# Patient Record
Sex: MALE | Race: WHITE | NOT HISPANIC OR LATINO | Employment: PART TIME | URBAN - METROPOLITAN AREA
[De-identification: names, ages, dates, MRNs, and addresses within clinical notes are randomized per-mention and may not be internally consistent; named-entity substitution may affect disease eponyms.]

---

## 2018-06-13 ENCOUNTER — OFFICE VISIT (OUTPATIENT)
Dept: INTERNAL MEDICINE | Facility: CLINIC | Age: 23
End: 2018-06-13
Payer: COMMERCIAL

## 2018-06-13 VITALS
DIASTOLIC BLOOD PRESSURE: 68 MMHG | HEART RATE: 74 BPM | WEIGHT: 185.1 LBS | TEMPERATURE: 98.5 F | BODY MASS INDEX: 25.07 KG/M2 | RESPIRATION RATE: 18 BRPM | SYSTOLIC BLOOD PRESSURE: 129 MMHG | OXYGEN SATURATION: 97 % | HEIGHT: 72 IN

## 2018-06-13 DIAGNOSIS — F31.81 BIPOLAR 2 DISORDER (H): Primary | ICD-10-CM

## 2018-06-13 PROBLEM — F31.70 BIPOLAR AFFECTIVE DISORDER IN REMISSION (H): Status: ACTIVE | Noted: 2018-06-13

## 2018-06-13 PROBLEM — F31.70 BIPOLAR AFFECTIVE DISORDER IN REMISSION (H): Status: RESOLVED | Noted: 2018-06-13 | Resolved: 2018-06-13

## 2018-06-13 LAB
BASOPHILS # BLD AUTO: 0 10E9/L (ref 0–0.2)
BASOPHILS NFR BLD AUTO: 0.5 %
DIFFERENTIAL METHOD BLD: NORMAL
EOSINOPHIL # BLD AUTO: 0.3 10E9/L (ref 0–0.7)
EOSINOPHIL NFR BLD AUTO: 4.9 %
ERYTHROCYTE [DISTWIDTH] IN BLOOD BY AUTOMATED COUNT: 12.7 % (ref 10–15)
HCT VFR BLD AUTO: 41.4 % (ref 40–53)
HGB BLD-MCNC: 14 G/DL (ref 13.3–17.7)
LYMPHOCYTES # BLD AUTO: 2.3 10E9/L (ref 0.8–5.3)
LYMPHOCYTES NFR BLD AUTO: 39.7 %
MCH RBC QN AUTO: 29.7 PG (ref 26.5–33)
MCHC RBC AUTO-ENTMCNC: 33.8 G/DL (ref 31.5–36.5)
MCV RBC AUTO: 88 FL (ref 78–100)
MONOCYTES # BLD AUTO: 0.4 10E9/L (ref 0–1.3)
MONOCYTES NFR BLD AUTO: 6.6 %
NEUTROPHILS # BLD AUTO: 2.8 10E9/L (ref 1.6–8.3)
NEUTROPHILS NFR BLD AUTO: 48.3 %
PLATELET # BLD AUTO: 181 10E9/L (ref 150–450)
RBC # BLD AUTO: 4.72 10E12/L (ref 4.4–5.9)
WBC # BLD AUTO: 5.7 10E9/L (ref 4–11)

## 2018-06-13 PROCEDURE — 99000 SPECIMEN HANDLING OFFICE-LAB: CPT | Performed by: INTERNAL MEDICINE

## 2018-06-13 PROCEDURE — 85025 COMPLETE CBC W/AUTO DIFF WBC: CPT | Performed by: INTERNAL MEDICINE

## 2018-06-13 PROCEDURE — 36415 COLL VENOUS BLD VENIPUNCTURE: CPT | Performed by: INTERNAL MEDICINE

## 2018-06-13 PROCEDURE — 80175 DRUG SCREEN QUAN LAMOTRIGINE: CPT | Mod: 90 | Performed by: INTERNAL MEDICINE

## 2018-06-13 PROCEDURE — 99214 OFFICE O/P EST MOD 30 MIN: CPT | Performed by: INTERNAL MEDICINE

## 2018-06-13 RX ORDER — LAMOTRIGINE 200 MG/1
200 TABLET ORAL DAILY
Refills: 0 | COMMUNITY
Start: 2018-06-01

## 2018-06-13 RX ORDER — BUPROPION HYDROCHLORIDE 300 MG/1
300 TABLET ORAL DAILY
Refills: 1 | COMMUNITY
Start: 2018-06-01

## 2018-06-13 NOTE — PROGRESS NOTES
SUBJECTIVE:   Fabián Larry is a 23 year old male who presents to clinic today for the following health issues:    He was diagnosed with bipolar disorder last year.     History of depression was originally taking medications, then diagnosed with bipolar disorder and started on buproprion and lamotrigine.     Doing well on these medications.     Also history of anxiety.     No thoughts of suicide      Problem list and histories reviewed & adjusted, as indicated.  Additional history: as documented    Patient Active Problem List   Diagnosis     Hiatal hernia     Bipolar 2 disorder (H)     Past Surgical History:   Procedure Laterality Date     DAVINCI NISSEN FUNDOPLICATION  9/3/2013    Procedure: DAVINCI NISSEN FUNDOPLICATION;  DAVINCI LAPAROSCOPIC, LYSIS OF ADHESIONS, RE-DO FOR RECURRENT HIATAL HERNIA REPAIR AND NISSEN FUNDOPLICATION, PLACEMENT OF BIO-A MESH, REPAIR OF G-TUBE SITE;  Surgeon: Abdias Pugh MD;  Location: [facility]     HERNIA REPAIR       LAPAROSCOPIC LYSIS ADHESIONS  9/3/2013    Procedure: LAPAROSCOPIC LYSIS ADHESIONS;;  Surgeon: Abdias Pugh MD;  Location: [facility] OR     venu fundoplication[       venu fundopli[         Social History   Substance Use Topics     Smoking status: Former Smoker     Quit date: 7/17/2013     Smokeless tobacco: Never Used     Alcohol use No     Family History   Problem Relation Age of Onset     Hypertension Mother      DIABETES Father            Reviewed and updated as needed this visit by clinical staff  Tobacco  Allergies  Meds  Med Hx  Surg Hx  Fam Hx  Soc Hx      Reviewed and updated as needed this visit by Provider         ROS:  Constitutional, HEENT, cardiovascular, pulmonary, gi and gu systems are negative, except as otherwise noted.    OBJECTIVE:     /68 (BP Location: Right arm, Patient Position: Sitting, Cuff Size: Adult Regular)  Pulse 74  Temp 98.5  F (36.9  C) (Oral)  Resp 18  Ht 6' (1.829 m)  Wt 185 lb 1.6 oz (84 kg)  SpO2 97%  BMI 25.1  kg/m2  Body mass index is 25.1 kg/(m^2).  GENERAL: healthy, alert and no distress  NECK: no adenopathy, no asymmetry, masses, or scars and thyroid normal to palpation  RESP: lungs clear to auscultation - no rales, rhonchi or wheezes  CV: regular rate and rhythm, normal S1 S2, no S3 or S4, no murmur, click or rub, no peripheral edema and peripheral pulses strong  ABDOMEN: soft, nontender, no hepatosplenomegaly, no masses and bowel sounds normal  MS: no gross musculoskeletal defects noted, no edema    Diagnostic Test Results:  none     ASSESSMENT/PLAN:       (F31.81) Bipolar 2 disorder (H)  (primary encounter diagnosis)  Comment:     Well controlled on medications    Will check lamictal level, cbc    Patient would like to establish psychiatry here, will place referral     Plan: MENTAL HEALTH REFERRAL  - Adult; Psychiatry and        Medication Management; Psychiatry; Other:         [facility] [phone];        We will contact you to schedule the appointment        or please call with any questions, Lamotrigine         Level, CBC with platelets differential          Lashanda Oh MD  [facility]

## 2018-06-13 NOTE — MR AVS SNAPSHOT
After Visit Summary   6/13/2018    Fabián Larry    MRN: [id number]           Patient Information     Date Of Birth          1995        Visit Information        Provider Department      6/13/2018 2:15 PM Lashanda Oh MD [facility]        Today's Diagnoses     Bipolar 2 disorder (H)    -  1       Follow-ups after your visit        Additional Services     MENTAL HEALTH REFERRAL  - Adult; Psychiatry and Medication Management; Psychiatry; Other: [facility] [phone]; We will contact you to schedule the appointment or please call with any questions       All scheduling is subject to the client's specific insurance plan & benefits, provider/location availability, and provider clinical specialities.  Please arrive 15 minutes early for your first appointment and bring your completed paperwork.    Please be aware that coverage of these services is subject to the terms and limitations of your health insurance plan.  Call member services at your health plan with any benefit or coverage questions.                            Who to contact     If you have questions or need follow up information about today's clinic visit or your schedule please contact [facility] directly at [phone].  Normal or non-critical lab and imaging results will be communicated to you by "[employer]hart, letter or phone within 4 business days after the clinic has received the results. If you do not hear from us within 7 days, please contact the clinic through "[employer]hart or phone. If you have a critical or abnormal lab result, we will notify you by phone as soon as possible.  Submit refill requests through [employer] or call your pharmacy and they will forward the refill request to us. Please allow 3 business days for your refill to be completed.          Additional Information About Your Visit        [employer] Information     [employer] lets you send messages to your doctor, view your  "test results, renew your prescriptions, schedule appointments and more. To sign up, go to [email or web address] . Click on \"Log in\" on the left side of the screen, which will take you to the Welcome page. Then click on \"Sign up Now\" on the right side of the page.     You will be asked to enter the access code listed below, as well as some personal information. Please follow the directions to create your username and password.     Your access code is: [email or web address]  Expires: 2018  1:57 PM     Your access code will  in 90 days. If you need help or a new code, please call your [place] clinic or [phone].        Care EveryWhere ID     This is your Care EveryWhere ID. This could be used by other organizations to access your [place] medical records  [id number]        Your Vitals Were     Pulse Temperature Respirations Height Pulse Oximetry BMI (Body Mass Index)    74 98.5  F (36.9  C) (Oral) 18 6' (1.829 m) 97% 25.1 kg/m2       Blood Pressure from Last 3 Encounters:   18 129/68   13 131/63   03/20/10 99/69    Weight from Last 3 Encounters:   18 185 lb 1.6 oz (84 kg)   13 179 lb (81.2 kg) (81 %)*   03/20/10 155 lb (70.3 kg) (79 %)*     * Growth percentiles are based on CDC 2-20 Years data.              We Performed the Following     CBC with platelets differential     Lamotrigine Level     MENTAL HEALTH REFERRAL  - Adult; Psychiatry and Medication Management; Psychiatry; Other: [facility] [phone]; We will contact you to schedule the appointment or please call with any questions        Primary Care Provider Office Phone # Fax #    Johnathan Sloan [phone]       [facility] [address] [address]        Equal Access to Services     [facility] EVERT AH: Roya Mensah, rush al, alva garay ah. [facility] [phone].    ATENCIÓN: Si " leonora rodriguez, tiene a ulloa disposición servicios gratuitos de asistencia lingüística. Alvina ontiveros [phone].    We comply with applicable federal civil rights laws and [place] laws. We do not discriminate on the basis of race, color, national origin, age, disability, sex, sexual orientation, or gender identity.            Thank you!     Thank you for choosing [facility]  for your care. Our goal is always to provide you with excellent care. Hearing back from our patients is one way we can continue to improve our services. Please take a few minutes to complete the written survey that you may receive in the mail after your visit with us. Thank you!             Your Updated Medication List - Protect others around you: Learn how to safely use, store and throw away your medicines at www.disposemymeds.org.          This list is accurate as of 6/13/18  2:43 PM.  Always use your most recent med list.                   Brand Name Dispense Instructions for use Diagnosis    buPROPion 300 MG 24 hr tablet    WELLBUTRIN XL     Take 300 mg by mouth daily        lamoTRIgine 200 MG tablet    LaMICtal     Take 200 mg by mouth daily

## 2018-06-13 NOTE — NURSING NOTE
Vital signs:  Temp: 98.5  F (36.9  C) Temp src: Oral BP: 129/68 Pulse: 74   Resp: 18 SpO2: 97 %     Height: 6' (182.9 cm) Weight: 185 lb 1.6 oz (84 kg)  Estimated body mass index is 25.1 kg/(m^2) as calculated from the following:    Height as of this encounter: 6' (1.829 m).    Weight as of this encounter: 185 lb 1.6 oz (84 kg).        He is here to establish care. He is UTD on immunizations.

## 2018-06-15 LAB — LAMOTRIGINE SERPL-MCNC: 1.6 UG/ML (ref 2.5–15)

## 2018-09-01 ENCOUNTER — OFFICE VISIT (OUTPATIENT)
Dept: URGENT CARE | Facility: URGENT CARE | Age: 23
End: 2018-09-01
Payer: COMMERCIAL

## 2018-09-01 VITALS
WEIGHT: 183 LBS | SYSTOLIC BLOOD PRESSURE: 130 MMHG | OXYGEN SATURATION: 94 % | RESPIRATION RATE: 12 BRPM | DIASTOLIC BLOOD PRESSURE: 74 MMHG | HEART RATE: 65 BPM | BODY MASS INDEX: 24.82 KG/M2 | TEMPERATURE: 97.7 F

## 2018-09-01 DIAGNOSIS — J00 ACUTE RHINITIS, UNSPECIFIED TYPE: Primary | ICD-10-CM

## 2018-09-01 DIAGNOSIS — R05.9 COUGH: ICD-10-CM

## 2018-09-01 DIAGNOSIS — J20.9 ACUTE BRONCHITIS, UNSPECIFIED ORGANISM: ICD-10-CM

## 2018-09-01 PROCEDURE — 99213 OFFICE O/P EST LOW 20 MIN: CPT | Performed by: FAMILY MEDICINE

## 2018-09-01 RX ORDER — CODEINE PHOSPHATE AND GUAIFENESIN 10; 100 MG/5ML; MG/5ML
1 SOLUTION ORAL EVERY 6 HOURS PRN
Qty: 120 ML | Refills: 0 | Status: SHIPPED | OUTPATIENT
Start: 2018-09-01 | End: 2023-04-27

## 2018-09-01 RX ORDER — FLUTICASONE PROPIONATE 50 MCG
2 SPRAY, SUSPENSION (ML) NASAL DAILY
Qty: 1 BOTTLE | Refills: 1 | Status: SHIPPED | OUTPATIENT
Start: 2018-09-01

## 2018-09-01 RX ORDER — MAGNESIUM OXIDE 400 MG/1
400 TABLET ORAL
COMMUNITY
Start: 2018-08-01

## 2018-09-01 RX ORDER — FEXOFENADINE HCL 180 MG/1
180 TABLET ORAL
COMMUNITY
Start: 2018-08-01

## 2018-09-01 RX ORDER — BENZONATATE 100 MG/1
200 CAPSULE ORAL 3 TIMES DAILY PRN
Qty: 42 CAPSULE | Refills: 3 | Status: SHIPPED | OUTPATIENT
Start: 2018-09-01 | End: 2023-04-27

## 2018-09-01 NOTE — MR AVS SNAPSHOT
After Visit Summary   9/1/2018    Fabián Larry    MRN: [id number]           Patient Information     Date Of Birth          1995        Visit Information        Provider Department      9/1/2018 10:25 AM Jeffrey Noguera MD [facility]        Today's Diagnoses     Acute rhinitis, unspecified type    -  1    Acute bronchitis, unspecified organism        Cough          Care Instructions    Drink plenty of water    follow up with your primary care provider if not better in 10 days.     If you develop copious thick nasal discharge, start either saline nasal rinses or Neti Pot nasal rinses              Follow-ups after your visit        Who to contact     If you have questions or need follow up information about today's clinic visit or your schedule please contact [facility] directly at [phone].  Normal or non-critical lab and imaging results will be communicated to you by MyChart, letter or phone within 4 business days after the clinic has received the results. If you do not hear from us within 7 days, please contact the clinic through [employer] or phone. If you have a critical or abnormal lab result, we will notify you by phone as soon as possible.  Submit refill requests through [employer] or call your pharmacy and they will forward the refill request to us. Please allow 3 business days for your refill to be completed.          Additional Information About Your Visit        MyChart Information     [employer] gives you secure access to your electronic health record. If you see a primary care provider, you can also send messages to your care team and make appointments. If you have questions, please call your primary care clinic.  If you do not have a primary care provider, please call [phone] and they will assist you.        Care EveryWhere ID     This is your Care EveryWhere ID. This could be used by other organizations to access your [facility]  records  [id number]        Your Vitals Were     Pulse Temperature Respirations Pulse Oximetry BMI (Body Mass Index)       65 97.7  F (36.5  C) (Tympanic) 12 94% 24.82 kg/m2        Blood Pressure from Last 3 Encounters:   09/01/18 130/74   06/13/18 129/68   09/05/13 131/63    Weight from Last 3 Encounters:   09/01/18 183 lb (83 kg)   06/13/18 185 lb 1.6 oz (84 kg)   09/03/13 179 lb (81.2 kg) (81 %)*     * Growth percentiles are based on [facility] 2-20 Years data.              Today, you had the following     No orders found for display         Today's Medication Changes          These changes are accurate as of 9/1/18 11:19 AM.  If you have any questions, ask your nurse or doctor.               Start taking these medicines.        Dose/Directions    benzonatate 100 MG capsule   Commonly known as:  TESSALON   Used for:  Cough   Started by:  Jeffrey Noguera MD        Dose:  200 mg   Take 2 capsules (200 mg) by mouth 3 times daily as needed   Quantity:  42 capsule   Refills:  3       fluticasone 50 MCG/ACT spray   Commonly known as:  FLONASE   Used for:  Acute rhinitis, unspecified type   Started by:  Jeffrey Noguera MD        Dose:  2 spray   Spray 2 sprays into both nostrils daily   Quantity:  1 Bottle   Refills:  1       guaiFENesin-codeine 100-10 MG/5ML Soln solution   Commonly known as:  ROBITUSSIN AC   Used for:  Cough   Started by:  Jeffrey Noguera MD        Dose:  1 tsp.   Take 5 mLs by mouth every 6 hours as needed   Quantity:  120 mL   Refills:  0            Where to get your medicines      Some of these will need a paper prescription and others can be bought over the counter.  Ask your nurse if you have questions.     Bring a paper prescription for each of these medications     benzonatate 100 MG capsule    fluticasone 50 MCG/ACT spray    guaiFENesin-codeine 100-10 MG/5ML Soln solution                Primary Care Provider Office Phone # Fax #    Lashanda Oh [phone]       [address]  [place]  [facility] [address]        Equal Access to Services     [facility] : Hadii amari arzola yevgeniy Mensah, wacharleenda luqadaha, qaybta kalanetteisiah acuna, alva pickardsarahesthela gonzales. [facility] [phone].    ATENCIÓN: Si habla español, tiene a ulloa disposición servicios gratuitos de asistencia lingüística. [facility] [phone].    We comply with applicable federal civil rights laws and [place] laws. We do not discriminate on the basis of race, color, national origin, age, disability, sex, sexual orientation, or gender identity.            Thank you!     Thank you for choosing [facility]  for your care. Our goal is always to provide you with excellent care. Hearing back from our patients is one way we can continue to improve our services. Please take a few minutes to complete the written survey that you may receive in the mail after your visit with us. Thank you!             Your Updated Medication List - Protect others around you: Learn how to safely use, store and throw away your medicines at www.disposemymeds.org.          This list is accurate as of 9/1/18 11:19 AM.  Always use your most recent med list.                   Brand Name Dispense Instructions for use Diagnosis    benzonatate 100 MG capsule    TESSALON    42 capsule    Take 2 capsules (200 mg) by mouth 3 times daily as needed    Cough       buPROPion 300 MG 24 hr tablet    WELLBUTRIN XL     Take 300 mg by mouth daily        fexofenadine 180 MG tablet    ALLEGRA     Take 180 mg by mouth        fluticasone 50 MCG/ACT spray    FLONASE    1 Bottle    Spray 2 sprays into both nostrils daily    Acute rhinitis, unspecified type       guaiFENesin-codeine 100-10 MG/5ML Soln solution    ROBITUSSIN AC    120 mL    Take 5 mLs by mouth every 6 hours as needed    Cough       lamoTRIgine 200 MG tablet    LaMICtal     Take 200 mg by mouth daily        magnesium oxide 400 MG tablet    MAG-OX     Take 400 mg by mouth        vitamin D3 1000  units Caps      Take 1,000 Units by mouth

## 2018-09-01 NOTE — PATIENT INSTRUCTIONS
Drink plenty of water    follow up with your primary care provider if not better in 10 days.     If you develop copious thick nasal discharge, start either saline nasal rinses or Neti Pot nasal rinses

## 2018-09-01 NOTE — PROGRESS NOTES
SUBJECTIVE:   Fabián Larry is a 23 year old male presenting with a chief complaint of cough (moderate coughing attacks, nonproductive hoarse cough), sinus congestion, pressure (near the bridge of the nose), chest congestion.  The cough has interfered with sleep.   Onset of symptoms was one day ago.  Course of illness is worsening. .    Current and Associated symptoms: as listed above.  No fevers.    Treatment measures tried include Neti Pot, nasal decongestant over the counter pill.  .  Predisposing factors include none.  .    Past Medical History:   Diagnosis Date     Asthma     exercise induced   Hiatal Hernia  Bipolar 2 disorder    Current Outpatient Prescriptions   Medication Sig Dispense Refill     buPROPion (WELLBUTRIN XL) 300 MG 24 hr tablet Take 300 mg by mouth daily  1     Cholecalciferol (VITAMIN D3) 1000 units CAPS Take 1,000 Units by mouth       fexofenadine (ALLEGRA) 180 MG tablet Take 180 mg by mouth       lamoTRIgine (LAMICTAL) 200 MG tablet Take 200 mg by mouth daily  0     magnesium oxide (MAG-OX) 400 MG tablet Take 400 mg by mouth       Social History   Substance Use Topics     Smoking status: Former Smoker     Quit date: 7/17/2013     Smokeless tobacco: Never Used     Alcohol use No       ROS:  Review of systems negative except as stated above.    OBJECTIVE:  /74  Pulse 65  Temp 97.7  F (36.5  C) (Tympanic)  Resp 12  Wt 183 lb (83 kg)  SpO2 94%  BMI 24.82 kg/m2  GENERAL APPEARANCE: healthy, alert and no distress.  Patient is mouth breathing and his voice sounds as if his nose is congested.   HENT: cerumen bilateral, nasal turbinates erythematous, swollen and oral mucous membranes moist, no erythema noted  NECK: supple, nontender, no lymphadenopathy  RESP: lungs clear to auscultation - no rales, rhonchi or wheezes  CV: regular rates and rhythm, normal S1 S2, no murmur noted  SKIN: no suspicious lesions or rashes    ASSESSMENT:  Cough  Acute Rhinitis  Acute Bronchitis    PLAN:  For  the Acute Rhinitis:  Rx:  Flonase Nasal [place]  Saline nasal rinses or Neti Pot  follow up with the primary care provider if not better in 10 days    For the Cough:  Rx:  Tessalon Perles and Cheratussin AC  follow up with the primary care provider if not better in 10 days.   See orders in Epic    Jeffrey Noguera MD

## 2018-09-27 ENCOUNTER — HOSPITAL ENCOUNTER (OUTPATIENT)
Dept: GENERAL RADIOLOGY | Facility: CLINIC | Age: 23
Discharge: HOME | End: 2018-09-27
Attending: PHYSICIAN ASSISTANT | Admitting: PHYSICIAN ASSISTANT
Payer: COMMERCIAL

## 2018-09-27 DIAGNOSIS — R05.9 COUGH: ICD-10-CM

## 2018-09-27 DIAGNOSIS — R11.0 NAUSEA: ICD-10-CM

## 2018-09-27 DIAGNOSIS — R13.10 DYSPHAGIA, UNSPECIFIED TYPE: ICD-10-CM

## 2018-09-27 DIAGNOSIS — Z71.3 DIETARY COUNSELING AND SURVEILLANCE: ICD-10-CM

## 2018-09-27 PROCEDURE — 74220 X-RAY XM ESOPHAGUS 1CNTRST: CPT

## 2019-10-02 ENCOUNTER — HEALTH MAINTENANCE LETTER (OUTPATIENT)
Age: 24
End: 2019-10-02

## 2021-01-15 ENCOUNTER — HEALTH MAINTENANCE LETTER (OUTPATIENT)
Age: 26
End: 2021-01-15

## 2021-02-26 ENCOUNTER — NEW PATIENT (OUTPATIENT)
Dept: URBAN - METROPOLITAN AREA CLINIC 44 | Facility: CLINIC | Age: 26
End: 2021-02-26
Payer: COMMERCIAL

## 2021-02-26 DIAGNOSIS — H52.13 MYOPIA, BILATERAL: Primary | ICD-10-CM

## 2021-02-26 PROCEDURE — 92004 COMPRE OPH EXAM NEW PT 1/>: CPT | Performed by: OPTOMETRIST

## 2021-02-26 ASSESSMENT — KERATOMETRY
OD: 41.25
OS: 41.75

## 2021-02-26 ASSESSMENT — VISUAL ACUITY
OD: 20/20
OS: 20/20

## 2021-02-26 ASSESSMENT — INTRAOCULAR PRESSURE
OD: 14
OS: 16

## 2021-09-04 ENCOUNTER — HEALTH MAINTENANCE LETTER (OUTPATIENT)
Age: 26
End: 2021-09-04

## 2022-02-19 ENCOUNTER — HEALTH MAINTENANCE LETTER (OUTPATIENT)
Age: 27
End: 2022-02-19

## 2022-10-22 ENCOUNTER — HEALTH MAINTENANCE LETTER (OUTPATIENT)
Age: 27
End: 2022-10-22

## 2023-04-01 ENCOUNTER — HEALTH MAINTENANCE LETTER (OUTPATIENT)
Age: 28
End: 2023-04-01

## 2023-04-27 ENCOUNTER — OFFICE VISIT (OUTPATIENT)
Dept: INTERNAL MEDICINE | Facility: CLINIC | Age: 28
End: 2023-04-27
Payer: COMMERCIAL

## 2023-04-27 VITALS
WEIGHT: 182.9 LBS | DIASTOLIC BLOOD PRESSURE: 68 MMHG | HEART RATE: 60 BPM | TEMPERATURE: 97.6 F | SYSTOLIC BLOOD PRESSURE: 118 MMHG | OXYGEN SATURATION: 97 % | BODY MASS INDEX: 24.81 KG/M2

## 2023-04-27 DIAGNOSIS — F31.81 BIPOLAR 2 DISORDER (H): ICD-10-CM

## 2023-04-27 DIAGNOSIS — Z13.220 ENCOUNTER FOR SCREENING FOR LIPID DISORDER: ICD-10-CM

## 2023-04-27 DIAGNOSIS — G47.19 EXCESSIVE DAYTIME SLEEPINESS: Primary | ICD-10-CM

## 2023-04-27 DIAGNOSIS — G47.9 SLEEP DIFFICULTIES: ICD-10-CM

## 2023-04-27 LAB
ALBUMIN SERPL BCG-MCNC: 4.8 G/DL (ref 3.5–5.2)
ALP SERPL-CCNC: 67 U/L (ref 40–129)
ALT SERPL W P-5'-P-CCNC: 6 U/L (ref 10–50)
ANION GAP SERPL CALCULATED.3IONS-SCNC: 10 MMOL/L (ref 7–15)
AST SERPL W P-5'-P-CCNC: 16 U/L (ref 10–50)
BASOPHILS # BLD AUTO: 0 10E3/UL (ref 0–0.2)
BASOPHILS NFR BLD AUTO: 0 %
BILIRUB SERPL-MCNC: 0.7 MG/DL
BUN SERPL-MCNC: 15 MG/DL (ref 6–20)
CALCIUM SERPL-MCNC: 9.5 MG/DL (ref 8.6–10)
CHLORIDE SERPL-SCNC: 104 MMOL/L (ref 98–107)
CHOLEST SERPL-MCNC: 189 MG/DL
CREAT SERPL-MCNC: 1.38 MG/DL (ref 0.67–1.17)
DEPRECATED HCO3 PLAS-SCNC: 27 MMOL/L (ref 22–29)
EOSINOPHIL # BLD AUTO: 0.1 10E3/UL (ref 0–0.7)
EOSINOPHIL NFR BLD AUTO: 3 %
ERYTHROCYTE [DISTWIDTH] IN BLOOD BY AUTOMATED COUNT: 12.6 % (ref 10–15)
FERRITIN SERPL-MCNC: 91 NG/ML (ref 31–409)
GFR SERPL CREATININE-BSD FRML MDRD: 71 ML/MIN/1.73M2
GLUCOSE SERPL-MCNC: 92 MG/DL (ref 70–99)
HCT VFR BLD AUTO: 43.7 % (ref 40–53)
HDLC SERPL-MCNC: 52 MG/DL
HGB BLD-MCNC: 14.8 G/DL (ref 13.3–17.7)
IRON BINDING CAPACITY (ROCHE): 305 UG/DL (ref 240–430)
IRON SATN MFR SERPL: 53 % (ref 15–46)
IRON SERPL-MCNC: 161 UG/DL (ref 61–157)
LDLC SERPL CALC-MCNC: 125 MG/DL
LYMPHOCYTES # BLD AUTO: 1.7 10E3/UL (ref 0.8–5.3)
LYMPHOCYTES NFR BLD AUTO: 31 %
MCH RBC QN AUTO: 29.7 PG (ref 26.5–33)
MCHC RBC AUTO-ENTMCNC: 33.9 G/DL (ref 31.5–36.5)
MCV RBC AUTO: 88 FL (ref 78–100)
MONOCYTES # BLD AUTO: 0.4 10E3/UL (ref 0–1.3)
MONOCYTES NFR BLD AUTO: 8 %
NEUTROPHILS # BLD AUTO: 3.2 10E3/UL (ref 1.6–8.3)
NEUTROPHILS NFR BLD AUTO: 59 %
NONHDLC SERPL-MCNC: 137 MG/DL
PLATELET # BLD AUTO: 184 10E3/UL (ref 150–450)
POTASSIUM SERPL-SCNC: 4.3 MMOL/L (ref 3.4–5.3)
PROT SERPL-MCNC: 6.9 G/DL (ref 6.4–8.3)
RBC # BLD AUTO: 4.98 10E6/UL (ref 4.4–5.9)
SODIUM SERPL-SCNC: 141 MMOL/L (ref 136–145)
TRIGL SERPL-MCNC: 62 MG/DL
TSH SERPL DL<=0.005 MIU/L-ACNC: 1.57 UIU/ML (ref 0.3–4.2)
WBC # BLD AUTO: 5.5 10E3/UL (ref 4–11)

## 2023-04-27 PROCEDURE — 83550 IRON BINDING TEST: CPT | Performed by: INTERNAL MEDICINE

## 2023-04-27 PROCEDURE — 36415 COLL VENOUS BLD VENIPUNCTURE: CPT | Performed by: INTERNAL MEDICINE

## 2023-04-27 PROCEDURE — 80050 GENERAL HEALTH PANEL: CPT | Performed by: INTERNAL MEDICINE

## 2023-04-27 PROCEDURE — 82728 ASSAY OF FERRITIN: CPT | Performed by: INTERNAL MEDICINE

## 2023-04-27 PROCEDURE — 83540 ASSAY OF IRON: CPT | Performed by: INTERNAL MEDICINE

## 2023-04-27 PROCEDURE — 80061 LIPID PANEL: CPT | Performed by: INTERNAL MEDICINE

## 2023-04-27 PROCEDURE — 99204 OFFICE O/P NEW MOD 45 MIN: CPT | Performed by: INTERNAL MEDICINE

## 2023-04-27 NOTE — PATIENT INSTRUCTIONS
- I will send you a message on [employer] when I am able to look at the results of your tests from today  - Sleep medicine referral placed

## 2023-04-27 NOTE — PROGRESS NOTES
Assessment & Plan   Excessive daytime sleepiness  Highest on my DDX for this is his reported poor sleep. Discussed with him that in my clinical opinion testosterone replacement therapy is over-prescribed, and that hypogonadism is low enough on my DDX for an otherwise healthy 28YOM with no physical signs of low testosterone that I would not recommend screening for it at this time. I would recommend updating his routine labs as it has been years. Discussed that lamotrigine can rarely cause blood dyscrasias that would be worth screening for with CBC. He was in agreement. Sleep medicine referral placed as well.  - Adult Sleep Eval & Management  Referral; Future  - CBC with Platelets & Differential; Future  - Comprehensive metabolic panel; Future  - TSH with free T4 reflex; Future  - Ferritin; Future  - Iron & Iron Binding Capacity; Future    Sleep difficulties  Reports excessive daytime sleepiness as well as poor sleep. Sleep medicine referral placed to discuss this if above lab work-up is reassuring.  - Adult Sleep Eval & Management  Referral; Future    Bipolar 2 disorder (H)  Known issue that I take into account for their medical decisions, no current exacerbations or new concerns. Defer cares to psychiatry.    Encounter for screening for lipid disorder  Fasting labs today.  - Lipid panel reflex to direct LDL Fasting; Future    Matthew Rizo MD  [facility]   Fabián is a 28 year old who presents today to establish care. This is the first time I have met Fabián. He's wondering about testosterone replacement and screening. He reports a doctor told him it would help with his sex drive, energy, and sleep difficulties. He denies any changes in his body hair composition or any shrinking of his testicles. He does report he only gets 2-4 hours of 'good sleep' a night. He's tired throughout the day. He has not had lab work done in years. He's on lamotrigine for  bipolar 2. He follows with psychiatry for that issue. His father has sleep apnea. He saw ENT yesterday for a deviated septum.    Review of Systems   Constitutional, HEENT, gu systems are negative, except as otherwise noted.      Objective    /68   Pulse 60   Temp 97.6  F (36.4  C) (Tympanic)   Wt 83 kg (182 lb 14.4 oz)   SpO2 97%   BMI 24.81 kg/m    Body mass index is 24.81 kg/m .     Physical Exam   GENERAL: alert and in no distress.  EYES: conjunctivae/corneas clear. EOMs grossly intact  HENT: Facies symmetric.  RESP: No iWOB.  MSK: Moves all four extremities freely  SKIN: No significant ulcers, lesions, or rashes on the visualized portions of the skin  NEURO: CN II-XII grossly intact.

## 2024-06-02 ENCOUNTER — HEALTH MAINTENANCE LETTER (OUTPATIENT)
Age: 29
End: 2024-06-02

## 2025-06-15 ENCOUNTER — HEALTH MAINTENANCE LETTER (OUTPATIENT)
Age: 30
End: 2025-06-15